# Patient Record
(demographics unavailable — no encounter records)

---

## 2024-12-23 NOTE — DISCUSSION/SUMMARY
[FreeTextEntry1] : Pelvic examination revealed moderate to severe atrophic changes of the vulva, vagina and cervix.  No Pap test was taken she is due for her next Pap test in December 2025.  She will otherwise return to this office as needed or for her next Pap test in 1 year.  Patient agrees with the recommendations and the plans.

## 2024-12-23 NOTE — REVIEW OF SYSTEMS
[Negative] : Heme/Lymph [FreeTextEntry2] : No breast exam was performed today.  The patient is seen on an annual basis by her breast specialist .  She prefers to have her evaluation done by the breast specialist annually.  No breast exam was performed by me today.